# Patient Record
Sex: MALE | Race: WHITE | NOT HISPANIC OR LATINO | Employment: FULL TIME | URBAN - NONMETROPOLITAN AREA
[De-identification: names, ages, dates, MRNs, and addresses within clinical notes are randomized per-mention and may not be internally consistent; named-entity substitution may affect disease eponyms.]

---

## 2017-06-21 ENCOUNTER — HOSPITAL ENCOUNTER (OUTPATIENT)
Facility: HOSPITAL | Age: 47
Setting detail: OBSERVATION
LOS: 1 days | Discharge: HOME | End: 2017-06-22
Attending: EMERGENCY MEDICINE | Admitting: FAMILY MEDICINE

## 2017-06-21 ENCOUNTER — APPOINTMENT (OUTPATIENT)
Dept: GENERAL RADIOLOGY | Facility: HOSPITAL | Age: 47
End: 2017-06-21

## 2017-06-21 ENCOUNTER — APPOINTMENT (OUTPATIENT)
Dept: CT IMAGING | Facility: HOSPITAL | Age: 47
End: 2017-06-21

## 2017-06-21 DIAGNOSIS — I48.91 ATRIAL FIBRILLATION WITH RVR (HCC): Primary | ICD-10-CM

## 2017-06-21 LAB
ALBUMIN SERPL-MCNC: 4.4 G/DL (ref 3.5–5)
ALBUMIN/GLOB SERPL: 1.5 G/DL (ref 1.1–2.5)
ALP SERPL-CCNC: 55 U/L (ref 24–120)
ALT SERPL W P-5'-P-CCNC: 42 U/L (ref 0–54)
AMYLASE SERPL-CCNC: 63 U/L (ref 30–110)
ANION GAP SERPL CALCULATED.3IONS-SCNC: 13 MMOL/L (ref 4–13)
APTT PPP: 31.6 SECONDS (ref 24.1–34.8)
ARTICHOKE IGE QN: 131 MG/DL (ref 0–99)
AST SERPL-CCNC: 24 U/L (ref 7–45)
BASOPHILS # BLD AUTO: 0.03 10*3/MM3 (ref 0–0.2)
BASOPHILS NFR BLD AUTO: 0.4 % (ref 0–2)
BILIRUB SERPL-MCNC: 0.7 MG/DL (ref 0.1–1)
BILIRUB UR QL STRIP: NEGATIVE
BUN BLD-MCNC: 11 MG/DL (ref 5–21)
BUN/CREAT SERPL: 11.7 (ref 7–25)
CALCIUM SPEC-SCNC: 9.3 MG/DL (ref 8.4–10.4)
CHLORIDE SERPL-SCNC: 104 MMOL/L (ref 98–110)
CHOLEST SERPL-MCNC: 192 MG/DL (ref 130–200)
CLARITY UR: CLEAR
CO2 SERPL-SCNC: 23 MMOL/L (ref 24–31)
COLOR UR: YELLOW
CREAT BLD-MCNC: 0.94 MG/DL (ref 0.5–1.4)
D DIMER PPP FEU-MCNC: <0.22 MG/L (FEU) (ref 0–0.5)
DEPRECATED RDW RBC AUTO: 43.9 FL (ref 40–54)
EOSINOPHIL # BLD AUTO: 0.12 10*3/MM3 (ref 0–0.7)
EOSINOPHIL NFR BLD AUTO: 1.7 % (ref 0–4)
ERYTHROCYTE [DISTWIDTH] IN BLOOD BY AUTOMATED COUNT: 13.2 % (ref 12–15)
GFR SERPL CREATININE-BSD FRML MDRD: 86 ML/MIN/1.73
GLOBULIN UR ELPH-MCNC: 3 GM/DL
GLUCOSE BLD-MCNC: 120 MG/DL (ref 70–100)
GLUCOSE UR STRIP-MCNC: NEGATIVE MG/DL
HBA1C MFR BLD: 5.5 %
HCT VFR BLD AUTO: 43.5 % (ref 40–52)
HDLC SERPL-MCNC: 33 MG/DL
HGB BLD-MCNC: 14.6 G/DL (ref 14–18)
HGB UR QL STRIP.AUTO: NEGATIVE
IMM GRANULOCYTES # BLD: 0.02 10*3/MM3 (ref 0–0.03)
IMM GRANULOCYTES NFR BLD: 0.3 % (ref 0–5)
INR PPP: 0.87 (ref 0.91–1.09)
KETONES UR QL STRIP: NEGATIVE
LDLC/HDLC SERPL: 3.62 {RATIO}
LEUKOCYTE ESTERASE UR QL STRIP.AUTO: NEGATIVE
LIPASE SERPL-CCNC: 82 U/L (ref 23–203)
LYMPHOCYTES # BLD AUTO: 2 10*3/MM3 (ref 0.72–4.86)
LYMPHOCYTES NFR BLD AUTO: 28.7 % (ref 15–45)
MCH RBC QN AUTO: 30.7 PG (ref 28–32)
MCHC RBC AUTO-ENTMCNC: 33.6 G/DL (ref 33–36)
MCV RBC AUTO: 91.6 FL (ref 82–95)
MONOCYTES # BLD AUTO: 0.53 10*3/MM3 (ref 0.19–1.3)
MONOCYTES NFR BLD AUTO: 7.6 % (ref 4–12)
NEUTROPHILS # BLD AUTO: 4.28 10*3/MM3 (ref 1.87–8.4)
NEUTROPHILS NFR BLD AUTO: 61.3 % (ref 39–78)
NITRITE UR QL STRIP: NEGATIVE
NT-PROBNP SERPL-MCNC: 117 PG/ML (ref 0–450)
PH UR STRIP.AUTO: 8 [PH] (ref 5–8)
PLATELET # BLD AUTO: 212 10*3/MM3 (ref 130–400)
PMV BLD AUTO: 12.3 FL (ref 6–12)
POTASSIUM BLD-SCNC: 4 MMOL/L (ref 3.5–5.3)
PROT SERPL-MCNC: 7.4 G/DL (ref 6.3–8.7)
PROT UR QL STRIP: NEGATIVE
PROTHROMBIN TIME: 12.1 SECONDS (ref 11.9–14.6)
RBC # BLD AUTO: 4.75 10*6/MM3 (ref 4.8–5.9)
SODIUM BLD-SCNC: 140 MMOL/L (ref 135–145)
SP GR UR STRIP: <=1.005 (ref 1–1.03)
TRIGL SERPL-MCNC: 197 MG/DL (ref 0–149)
TROPONIN I SERPL-MCNC: 0 NG/ML (ref 0–0.07)
TSH SERPL DL<=0.05 MIU/L-ACNC: 1.66 MIU/ML (ref 0.47–4.68)
UROBILINOGEN UR QL STRIP: NORMAL
WBC NRBC COR # BLD: 6.98 10*3/MM3 (ref 4.8–10.8)

## 2017-06-21 PROCEDURE — 80061 LIPID PANEL: CPT | Performed by: FAMILY MEDICINE

## 2017-06-21 PROCEDURE — 82150 ASSAY OF AMYLASE: CPT | Performed by: EMERGENCY MEDICINE

## 2017-06-21 PROCEDURE — 0 IOPAMIDOL PER 1 ML: Performed by: EMERGENCY MEDICINE

## 2017-06-21 PROCEDURE — G0378 HOSPITAL OBSERVATION PER HR: HCPCS

## 2017-06-21 PROCEDURE — 85025 COMPLETE CBC W/AUTO DIFF WBC: CPT | Performed by: EMERGENCY MEDICINE

## 2017-06-21 PROCEDURE — 96366 THER/PROPH/DIAG IV INF ADDON: CPT

## 2017-06-21 PROCEDURE — 84443 ASSAY THYROID STIM HORMONE: CPT | Performed by: EMERGENCY MEDICINE

## 2017-06-21 PROCEDURE — 71010 HC CHEST PA OR AP: CPT

## 2017-06-21 PROCEDURE — 84484 ASSAY OF TROPONIN QUANT: CPT

## 2017-06-21 PROCEDURE — 85730 THROMBOPLASTIN TIME PARTIAL: CPT | Performed by: EMERGENCY MEDICINE

## 2017-06-21 PROCEDURE — 85610 PROTHROMBIN TIME: CPT | Performed by: EMERGENCY MEDICINE

## 2017-06-21 PROCEDURE — 81003 URINALYSIS AUTO W/O SCOPE: CPT | Performed by: EMERGENCY MEDICINE

## 2017-06-21 PROCEDURE — 96372 THER/PROPH/DIAG INJ SC/IM: CPT

## 2017-06-21 PROCEDURE — 99285 EMERGENCY DEPT VISIT HI MDM: CPT

## 2017-06-21 PROCEDURE — 71275 CT ANGIOGRAPHY CHEST: CPT

## 2017-06-21 PROCEDURE — 25010000002 ENOXAPARIN PER 10 MG: Performed by: EMERGENCY MEDICINE

## 2017-06-21 PROCEDURE — 96365 THER/PROPH/DIAG IV INF INIT: CPT

## 2017-06-21 PROCEDURE — 83690 ASSAY OF LIPASE: CPT | Performed by: EMERGENCY MEDICINE

## 2017-06-21 PROCEDURE — 80053 COMPREHEN METABOLIC PANEL: CPT | Performed by: EMERGENCY MEDICINE

## 2017-06-21 PROCEDURE — 93005 ELECTROCARDIOGRAM TRACING: CPT | Performed by: EMERGENCY MEDICINE

## 2017-06-21 PROCEDURE — 93010 ELECTROCARDIOGRAM REPORT: CPT | Performed by: INTERNAL MEDICINE

## 2017-06-21 PROCEDURE — 83036 HEMOGLOBIN GLYCOSYLATED A1C: CPT | Performed by: FAMILY MEDICINE

## 2017-06-21 PROCEDURE — 83880 ASSAY OF NATRIURETIC PEPTIDE: CPT | Performed by: EMERGENCY MEDICINE

## 2017-06-21 PROCEDURE — 85379 FIBRIN DEGRADATION QUANT: CPT | Performed by: EMERGENCY MEDICINE

## 2017-06-21 RX ORDER — ONDANSETRON 2 MG/ML
4 INJECTION INTRAMUSCULAR; INTRAVENOUS EVERY 6 HOURS PRN
Status: DISCONTINUED | OUTPATIENT
Start: 2017-06-21 | End: 2017-06-22 | Stop reason: HOSPADM

## 2017-06-21 RX ORDER — SODIUM CHLORIDE 0.9 % (FLUSH) 0.9 %
1-10 SYRINGE (ML) INJECTION AS NEEDED
Status: DISCONTINUED | OUTPATIENT
Start: 2017-06-21 | End: 2017-06-22 | Stop reason: HOSPADM

## 2017-06-21 RX ORDER — ACETAMINOPHEN 325 MG/1
650 TABLET ORAL EVERY 4 HOURS PRN
Status: DISCONTINUED | OUTPATIENT
Start: 2017-06-21 | End: 2017-06-22 | Stop reason: HOSPADM

## 2017-06-21 RX ORDER — ASPIRIN 325 MG
325 TABLET, DELAYED RELEASE (ENTERIC COATED) ORAL DAILY
Status: DISCONTINUED | OUTPATIENT
Start: 2017-06-21 | End: 2017-06-22 | Stop reason: HOSPADM

## 2017-06-21 RX ORDER — ALUMINA, MAGNESIA, AND SIMETHICONE 2400; 2400; 240 MG/30ML; MG/30ML; MG/30ML
15 SUSPENSION ORAL EVERY 6 HOURS PRN
Status: DISCONTINUED | OUTPATIENT
Start: 2017-06-21 | End: 2017-06-22 | Stop reason: HOSPADM

## 2017-06-21 RX ORDER — DILTIAZEM HCL/D5W 125 MG/125
10 PLASTIC BAG, INJECTION (ML) INTRAVENOUS
Status: DISCONTINUED | OUTPATIENT
Start: 2017-06-21 | End: 2017-06-21

## 2017-06-21 RX ORDER — TRAMADOL HYDROCHLORIDE 50 MG/1
50 TABLET ORAL EVERY 6 HOURS PRN
Status: DISCONTINUED | OUTPATIENT
Start: 2017-06-21 | End: 2017-06-22 | Stop reason: HOSPADM

## 2017-06-21 RX ADMIN — Medication 10 MG/HR: at 12:46

## 2017-06-21 RX ADMIN — ENOXAPARIN SODIUM 100 MG: 100 INJECTION SUBCUTANEOUS at 14:49

## 2017-06-21 RX ADMIN — ASPIRIN 325 MG: 325 TABLET, COATED ORAL at 17:49

## 2017-06-21 RX ADMIN — DILTIAZEM HYDROCHLORIDE 30 MG: 30 TABLET, FILM COATED ORAL at 16:14

## 2017-06-21 RX ADMIN — IOPAMIDOL 150 ML: 755 INJECTION, SOLUTION INTRAVENOUS at 13:18

## 2017-06-21 NOTE — H&P
"    [facility]  HISTORY AND PHYSICAL    Date of Admission: 2017  Primary Care Physician: No Known Provider    Subjective     Chief Complaint: Fast heart rate.     History of Present Illness  Onset of symptoms earlier today.  Fast heart rate.  Fluttering.  Some nausea.   This resolved with heart rate converted.  Had a similar episode about three months ago but did not seek help and it abated on its own.  Does not see doctor routinely.  Goes to [facility] once every other year for seasonal illness.         Review of Systems   Constitutional: Negative.  Negative for fever.   HENT: Negative for congestion and mouth sores.    Eyes: Negative.  Negative for visual disturbance.   Respiratory: Negative for cough and shortness of breath.    Cardiovascular: Positive for palpitations. Negative for chest pain.   Gastrointestinal: Positive for nausea. Negative for diarrhea and vomiting.   Genitourinary: Negative.    Musculoskeletal: Negative for arthralgias and myalgias.   Skin: Negative.    Allergic/Immunologic: Negative.    Neurological: Negative for dizziness.   Hematological: Negative.    Psychiatric/Behavioral: Negative.               Past Medical History: No illnesses  Past Surgical History:None    Social History:  reports that he has never smoked. His smokeless tobacco use includes Chew. He reports that he does not use illicit drugs.   No DOPA  Wife will be surrogate  No living will  Full code  No PCP      Family History:Mother  from complications of wreck/MRSA/[place] last year.   Father alive and healthy    Allergies:  Allergies   Allergen Reactions   • Codeine Nausea And Vomiting   Nausea    Medications:  None          Objective     Vital Signs: /61  Pulse 60  Temp 98.8 °F (37.1 °C)  Resp 19  Ht 69\" (175.3 cm)  Wt 215 lb (97.5 kg)  SpO2 95%  BMI 31.75 kg/m2  Physical Exam   Constitutional: He is oriented to person, place, and time. He appears " well-developed and well-nourished.   Eyes: Conjunctivae and EOM are normal. Pupils are equal, round, and reactive to light.   Neck: Neck supple.   Cardiovascular: Normal rate and regular rhythm.  Exam reveals no gallop and no friction rub.    No murmur heard.  Pulmonary/Chest: Effort normal and breath sounds normal.   Abdominal: Soft. Bowel sounds are normal. There is no hepatosplenomegaly. There is no tenderness.   Musculoskeletal: Normal range of motion. He exhibits no edema.   Neurological: He is alert and oriented to person, place, and time. No cranial nerve deficit.   Skin: Skin is warm and dry.   Psychiatric: He has a normal mood and affect. His behavior is normal.   Nursing note and vitals reviewed.          Results Reviewed:  Lab Results (last 24 hours)     Procedure Component Value Units Date/Time    CBC & Differential [[id number]] Collected:  06/21/17 1222    Specimen:  Blood Updated:  06/21/17 1232    Narrative:       The following orders were created for panel order CBC & Differential.  Procedure                               Abnormality         Status                     ---------                               -----------         ------                     CBC Auto Differential[[id number]]        Abnormal            Final result                 Please view results for these tests on the individual orders.    CBC Auto Differential [[id number]]  (Abnormal) Collected:  06/21/17 1222    Specimen:  Blood Updated:  06/21/17 1232     WBC 6.98 10*3/mm3      RBC 4.75 (L) 10*6/mm3      Hemoglobin 14.6 g/dL      Hematocrit 43.5 %      MCV 91.6 fL      MCH 30.7 pg      MCHC 33.6 g/dL      RDW 13.2 %      RDW-SD 43.9 fl      MPV 12.3 (H) fL      Platelets 212 10*3/mm3      Neutrophil % 61.3 %      Lymphocyte % 28.7 %      Monocyte % 7.6 %      Eosinophil % 1.7 %      Basophil % 0.4 %      Immature Grans % 0.3 %      Neutrophils, Absolute 4.28 10*3/mm3      Lymphocytes, Absolute 2.00 10*3/mm3      Monocytes, Absolute  0.53 10*3/mm3      Eosinophils, Absolute 0.12 10*3/mm3      Basophils, Absolute 0.03 10*3/mm3      Immature Grans, Absolute 0.02 10*3/mm3     Protime-INR [[id number]]  (Abnormal) Collected:  06/21/17 1222    Specimen:  Blood Updated:  06/21/17 1245     Protime 12.1 Seconds      INR 0.87 (L)    aPTT [[id number]]  (Normal) Collected:  06/21/17 1222    Specimen:  Blood Updated:  06/21/17 1245     PTT 31.6 seconds     D-dimer, Quantitative [[id number]]  (Normal) Collected:  06/21/17 1222    Specimen:  Blood Updated:  06/21/17 1245     D-Dimer, Quantitative <0.22 mg/L (FEU)     Narrative:       Reference Range is 0-0.50 mg/L FEU. However, results <0.50 mg/L FEU tends to rule out DVT or PE. Results >0.50 mg/L FEU are not useful in predicting absence or presence of DVT or PE.    Comprehensive Metabolic Panel [[id number]]  (Abnormal) Collected:  06/21/17 1222    Specimen:  Blood Updated:  06/21/17 1246     Glucose 120 (H) mg/dL      BUN 11 mg/dL      Creatinine 0.94 mg/dL      Sodium 140 mmol/L      Potassium 4.0 mmol/L      Chloride 104 mmol/L      CO2 23.0 (L) mmol/L      Calcium 9.3 mg/dL      Total Protein 7.4 g/dL      Albumin 4.40 g/dL      ALT (SGPT) 42 U/L      AST (SGOT) 24 U/L      Alkaline Phosphatase 55 U/L      Total Bilirubin 0.7 mg/dL      eGFR Non African Amer 86 mL/min/1.73      Globulin 3.0 gm/dL      A/G Ratio 1.5 g/dL      BUN/Creatinine Ratio 11.7     Anion Gap 13.0 mmol/L     Amylase [[id number]]  (Normal) Collected:  06/21/17 1222    Specimen:  Blood Updated:  06/21/17 1246     Amylase 63 U/L     Lipase [[id number]]  (Normal) Collected:  06/21/17 1222    Specimen:  Blood Updated:  06/21/17 1246     Lipase 82 U/L     POC Troponin, Rapid [[id number]]  (Normal) Collected:  06/21/17 1237    Specimen:  Blood Updated:  06/21/17 1252     Troponin I 0.00 ng/mL       Serial Number: [id number]    : [id number]       BNP [[id number]]  (Normal) Collected:  06/21/17 1222    Specimen:  Blood Updated:  06/21/17 1253      proBNP 117.0 pg/mL     Urinalysis With / Culture If Indicated [[id number]]  (Normal) Collected:  06/21/17 1255    Specimen:  Urine from Urine, Clean Catch Updated:  06/21/17 1258     Color, UA Yellow     Appearance, UA Clear     pH, UA 8.0     Specific Gravity, UA <=1.005     Glucose, UA Negative     Ketones, UA Negative     Bilirubin, UA Negative     Blood, UA Negative     Protein, UA Negative     Leuk Esterase, UA Negative     Nitrite, UA Negative     Urobilinogen, UA 0.2 E.U./dL    Narrative:       Urine microscopic not indicated.    TSH [[id number]]  (Normal) Collected:  06/21/17 1222    Specimen:  Blood Updated:  06/21/17 1316     TSH 1.660 mIU/mL         Imaging Results (last 24 hours)     Procedure Component Value Units Date/Time    XR Chest 1 View [[id number]] Collected:  06/21/17 1316     Updated:  06/21/17 1316    Narrative:       Exam:   XR CHEST 1 VW-       Date:  06/21/2017      History:  Male, age  46 years;afib with RVR     COMPARISON:  None.     Findings :     The heart and mediastinum are normal in size. Lungs are without focal  infiltrate, mass or effusions.  The bones show no acute pathology.         Impression:       Impression:     No acute cardiopulmonary disease.     This report was finalized on  by Dr. Rosana Miner MD.    CT Angiogram Chest With Contrast [[id number]] Collected:  06/21/17 1418     Updated:  06/21/17 1449    Narrative:       EXAMINATION: CT ANGIOGRAM CHEST W CONTRAST-  6/21/2017 2:09 PM CDT     HISTORY:Palpitations.     COMPARISON: None.     TECHNIQUE:     CT evaluation of the chest was performed with intravenous contrast. 2 mm  transaxial images were obtained. 3-D reconstruction angiographic images  were generated using a volume rendering technique. Coronal  reconstruction maximum intensity projection images of the pulmonary  arteries and aorta were also generated.     Radiation dose equals  mGy-cm.  Automated exposure control dose  reduction technique was implemented.         FINDINGS:     The pulmonary arteries are adequately opacified with iodinated contrast  without intraluminal filling defects or CT angiographic evidence of  pulmonary embolus.     There is no mediastinal or hilar lymphadenopathy.     There is no axillary lymph node enlargement.     There are no consolidating parenchymal infiltrates, pleural effusions or  pneumothoraces.     Limited imaging of the upper abdomen is unremarkable.     The bony structures are intact.       Impression:       1. No CT angiographic evidence of pulmonary embolus or acute aortic  pathology.  This report was finalized on 06/21/2017 14:46 by Dr. Vince Simons MD.          I have personally reviewed and interpreted the radiology studies and ECG obtained at time of admission.     Assessment / Plan     Assessment & Plan  Hospital Problem List     Atrial fibrillation with RVR        Assessment    Paroxysmal atrial fibrillation now in sinus rhythm    Plan    Admit obs.  Change to oral cardizem  Add aspirin  Check A!C and lipid panel  Likely DC in AM      Code Status: full     I discussed the patients findings and my recommendations with patient    Estimated length of stay less than 24 hours  Bobbi Patten,    06/21/17   2:57 PM

## 2017-06-21 NOTE — PLAN OF CARE
Problem: Patient Care Overview (Adult)  Goal: Plan of Care Review  Outcome: Ongoing (interventions implemented as appropriate)    06/21/17 1544   Coping/Psychosocial Response Interventions   Plan Of Care Reviewed With patient;spouse   Patient Care Overview   Progress improving   Outcome Evaluation   Outcome Summary/Follow up Plan pt HR controlled with card gtt at 60         Problem: Arrhythmia/Dysrhythmia (Symptomatic) (Adult)  Goal: Signs and Symptoms of Listed Potential Problems Will be Absent or Manageable (Arrhythmia/Dysrhythmia)  Outcome: Ongoing (interventions implemented as appropriate)

## 2017-06-21 NOTE — ED PROVIDER NOTES
Subjective shortness of breath and palpitations  History of Present Illness   Mr. Barriga is a 46-year-old white man who comes in today with chief complaint of palpitations shortness of breath and dizziness.  The patient tells me that about 10:30 AM he was sitting in his recliner drink coffee when all of a sudden he felt short of breath and dizzy.  That time he states he felt as if his heart was jumping out of his chest.  He does admit that he had a prior episode similar to this one approximately 4 months ago.  That episode however resolved and he did not follow-up.  When the symptoms started today when he became dizzy and short of breath he became concerned and subsequently decided he would come to the hospital for evaluation.  Upon arrival in emergency room his heart rate was in the 160s and it was irregular.    Review of Systems   Constitutional: Negative.    HENT: Negative.    Eyes: Negative.    Respiratory: Negative.    Cardiovascular: Positive for palpitations.   Gastrointestinal: Negative.    Endocrine: Negative.    Genitourinary: Negative.    Musculoskeletal: Negative.    Skin: Negative.    Allergic/Immunologic: Negative.    Neurological: Positive for dizziness.   Hematological: Negative.    Psychiatric/Behavioral: Negative.    All other systems reviewed and are negative.      History reviewed. No pertinent past medical history.    Allergies   Allergen Reactions   • Codeine Nausea And Vomiting       History reviewed. No pertinent surgical history.    History reviewed. No pertinent family history.    Social History     Social History   • Marital status:      Spouse name: N/A   • Number of children: N/A   • Years of education: N/A     Social History Main Topics   • Smoking status: Never Smoker   • Smokeless tobacco: Current User     Types: Chew      Comment: 2x weekly   • Alcohol use None      Comment: 1x monthly    • Drug use: No   • Sexual activity: Not Asked     Other Topics Concern   • None      Social History Narrative   • None           Objective   Physical Exam   Constitutional: He is oriented to person, place, and time. He appears well-developed and well-nourished.   HENT:   Head: Normocephalic and atraumatic.   Right Ear: External ear normal.   Left Ear: External ear normal.   Nose: Nose normal.   Mouth/Throat: Oropharynx is clear and moist.   Eyes: Conjunctivae and EOM are normal. Pupils are equal, round, and reactive to light. Right eye exhibits no discharge. Left eye exhibits no discharge.   Neck: Normal range of motion. Neck supple. No thyromegaly present.   Cardiovascular: Normal heart sounds and intact distal pulses.  Exam reveals no friction rub.    No murmur heard.  Irregularly irregular   Pulmonary/Chest: Effort normal and breath sounds normal. No respiratory distress.   Abdominal: Soft. Bowel sounds are normal. He exhibits no distension. There is no tenderness.   Musculoskeletal: Normal range of motion. He exhibits no edema or deformity.   Neurological: He is alert and oriented to person, place, and time. He has normal reflexes. No cranial nerve deficit.   Skin: Skin is warm and dry. No rash noted.   Psychiatric: Judgment normal.   Nursing note and vitals reviewed.      Procedures         ED Course  ED Course   Comment By Time   the chest appears to converted on the Cardizem.  My plan is to speak to the hospitalist and get him admitted to their services.  He is hemodynamically stable. Janneth Martínez MD 06/21 1419                  MDM  Number of Diagnoses or Management Options  Atrial fibrillation with RVR: new and requires workup     Amount and/or Complexity of Data Reviewed  Clinical lab tests: ordered and reviewed  Tests in the radiology section of CPT®: ordered and reviewed  Discuss the patient with other providers: yes    Risk of Complications, Morbidity, and/or Mortality  Presenting problems: high  Diagnostic procedures: high  Management options: high    Patient  Progress  Patient progress: stable      Final diagnoses:   Atrial fibrillation with RVR            Janneth Martínez MD  06/21/17 6307

## 2017-06-22 VITALS
OXYGEN SATURATION: 98 % | HEIGHT: 69 IN | BODY MASS INDEX: 31.87 KG/M2 | RESPIRATION RATE: 20 BRPM | TEMPERATURE: 98.8 F | SYSTOLIC BLOOD PRESSURE: 114 MMHG | DIASTOLIC BLOOD PRESSURE: 65 MMHG | HEART RATE: 67 BPM | WEIGHT: 215.2 LBS

## 2017-06-22 PROCEDURE — G0378 HOSPITAL OBSERVATION PER HR: HCPCS

## 2017-06-22 RX ORDER — LOPERAMIDE HYDROCHLORIDE 2 MG/1
2 CAPSULE ORAL 4 TIMES DAILY PRN
Status: DISCONTINUED | OUTPATIENT
Start: 2017-06-22 | End: 2017-06-22 | Stop reason: HOSPADM

## 2017-06-22 RX ORDER — ASPIRIN 81 MG/1
81 TABLET ORAL DAILY
Start: 2017-06-22

## 2017-06-22 RX ADMIN — ASPIRIN 325 MG: 325 TABLET, COATED ORAL at 08:28

## 2017-06-22 RX ADMIN — LOPERAMIDE HYDROCHLORIDE 2 MG: 2 CAPSULE ORAL at 09:05

## 2017-06-22 RX ADMIN — DILTIAZEM HYDROCHLORIDE 30 MG: 30 TABLET, FILM COATED ORAL at 12:09

## 2017-06-22 NOTE — PROGRESS NOTES
Discharge Planning Assessment  [facility]     Patient Name: Rahel Pineda  MRN: [id number]  Today's Date: 6/22/2017    Admit Date: 6/21/2017          Discharge Needs Assessment       06/22/17 0847    Living Environment    Lives With spouse    Living Arrangements house    Home Accessibility no concerns    Stair Railings at Home none    Type of Financial/Environmental Concern none    Transportation Available family or friend will provide    Living Environment    Provides Primary Care For no one    Quality Of Family Relationships supportive    Able to Return to Prior Living Arrangements yes    Discharge Needs Assessment    Concerns To Be Addressed no discharge needs identified;denies needs/concerns at this time    Readmission Within The Last 30 Days no previous admission in last 30 days    Anticipated Changes Related to Illness none    Equipment Currently Used at Home none    Equipment Needed After Discharge none    Discharge Disposition home or self-care            Discharge Plan       06/22/17 0837    Case Management/Social Work Plan    Plan PT resides at home with spouse and is independent. PT plans to dc home with no known needs. Will follow.     Patient/Family In Agreement With Plan yes        Discharge Placement     No information found                Demographic Summary     None            Functional Status     None            Psychosocial     None            Abuse/Neglect     None            Legal     None            Substance Abuse     None            Patient Forms     None          VERA Bender

## 2017-06-22 NOTE — PLAN OF CARE
Problem: Patient Care Overview (Adult)  Goal: Plan of Care Review  Outcome: Ongoing (interventions implemented as appropriate)    06/22/17 0414   Coping/Psychosocial Response Interventions   Plan Of Care Reviewed With patient   Patient Care Overview   Progress improving   Outcome Evaluation   Outcome Summary/Follow up Plan VSS. NSR/SB 51-73, as low as 49 on tele. PO Cardizem Q6H. Denies pain this shift. Labs pending this AM. Poss DC this AM. Will cont to monitor.         Problem: Arrhythmia/Dysrhythmia (Symptomatic) (Adult)  Goal: Signs and Symptoms of Listed Potential Problems Will be Absent or Manageable (Arrhythmia/Dysrhythmia)  Outcome: Ongoing (interventions implemented as appropriate)

## 2017-06-22 NOTE — DISCHARGE SUMMARY
[facility] Medicine Services  DISCHARGE SUMMARY       Date of Admission: 6/21/2017  Date of Discharge:  6/22/2017  Primary Care Physician: No Known Provider    Presenting Problem/History of Present Illness:  Atrial fibrillation with RVR [I48.91]  Atrial fibrillation with RVR [I48.91]     Final Discharge Diagnoses:  Hospital Problem List     Atrial fibrillation with RVR          Consults: none    Procedures Performed: none    Pertinent Test Results:    Specimen: Blood Updated: 06/21/17 1246         Glucose 120 (H) mg/dL         BUN 11 mg/dL         Creatinine 0.94 mg/dL         Sodium 140 mmol/L         Potassium 4.0 mmol/L         Chloride 104 mmol/L         CO2 23.0 (L) mmol/L         Calcium 9.3 mg/dL         Total Protein 7.4 g/dL         Albumin 4.40 g/dL         ALT (SGPT) 42 U/L         AST (SGOT) 24 U/L         Alkaline Phosphatase 55 U/L         Total Bilirubin 0.7 mg/dL         eGFR Non African Amer 86 mL/min/1.73         Globulin 3.0 gm/dL         A/G Ratio 1.5 g/dL         BUN/Creatinine Ratio 11.7        Anion Gap 13.0 mmol/L        Protime-INR [[id number]] (Abnormal) Collected: 06/21/17 1222       Lab Status: Final result Specimen: Blood Updated: 06/21/17 1245        Protime 12.1 Seconds         INR 0.87 (L)       aPTT [[id number]] (Normal) Collected: 06/21/17 1222       Lab Status: Final result Specimen: Blood Updated: 06/21/17 1245        PTT 31.6 seconds        D-dimer, Quantitative [[id number]] (Normal) Collected: 06/21/17 1222       Lab Status: Final result Specimen: Blood Updated: 06/21/17 1245        D-Dimer, Quantitative <0.22 mg/L (FEU)        Narrative:         Reference Range is 0-0.50 mg/L FEU. However, results <0.50 mg/L FEU tends to rule out DVT or PE. Results >0.50 mg/L FEU are not useful in predicting absence or presence of DVT or PE.       BNP [[id number]] (Normal) Collected: 06/21/17 1222       Lab Status: Final result Specimen: Blood Updated: 06/21/17  1253        proBNP 117.0 pg/mL        Amylase [[id number]] (Normal) Collected: 06/21/17 1222       Lab Status: Final result Specimen: Blood Updated: 06/21/17 1246        Amylase 63 U/L        Lipase [[id number]] (Normal) Collected: 06/21/17 1222       Lab Status: Final result Specimen: Blood Updated: 06/21/17 1246        Lipase 82 U/L        TSH [[id number]] (Normal) Collected: 06/21/17 1222       Lab Status: Final result Specimen: Blood Updated: 06/21/17 1316        TSH 1.660 mIU/mL        CBC Auto Differential [[id number]] (Abnormal) Collected: 06/21/17 1222       Lab Status: Final result Specimen: Blood Updated: 06/21/17 1232        WBC 6.98 10*3/mm3         RBC 4.75 (L) 10*6/mm3         Hemoglobin 14.6 g/dL         Hematocrit 43.5 %         MCV 91.6 fL         MCH 30.7 pg         MCHC 33.6 g/dL         RDW 13.2 %         RDW-SD 43.9 fl         MPV 12.3 (H) fL         Platelets 212 10*3/mm3         Neutrophil % 61.3 %         Lymphocyte % 28.7 %         Monocyte % 7.6 %         Eosinophil % 1.7 %         Basophil % 0.4 %         Immature Grans % 0.3 %         Neutrophils, Absolute 4.28 10*3/mm3         Lymphocytes, Absolute 2.00 10*3/mm3         Monocytes, Absolute 0.53 10*3/mm3         Eosinophils, Absolute 0.12 10*3/mm3         Basophils, Absolute 0.03 10*3/mm3         Immature Grans, Absolute 0.02 10*3/mm3        Hemoglobin A1c [[id number]] Collected: 06/21/17 1222       Lab Status: Final result Specimen: Blood Updated: 06/21/17 1707        Hemoglobin A1C 5.5 %        Narrative:         Less than 6.0           Non-Diabetic Range  6.0-7.0                 ADA Therapeutic Target  Greater than 7.0        Action Suggested       Lipid Panel [[id number]] (Abnormal) Collected: 06/21/17 1222       Lab Status: Final result Specimen: Blood Updated: 06/21/17 1607        Total Cholesterol 192 mg/dL         Triglycerides 197 (H) mg/dL         HDL Cholesterol 33 (L) mg/dL         LDL Cholesterol  131 (H) mg/dL         LDL/HDL  "Ratio 3.62             Chief Complaint on Day of Discharge: Medicine makes heart rate too slow.         Hospital Course:  The patient is a 46 y.o. male who presented to [facility] with atrial fibrillation with rapid ventricular response.  Started on cardizem drip.  Converted to a NSR in the ER.  Admitted to observation.  Oral cardizem given x 1.  Held thereafter for low heart rate.  On morning of discharge has no complaints.       Condition on Discharge:  Stable     Physical Exam on Discharge:  /67  Pulse 55  Temp 97.3 °F (36.3 °C)  Resp 20  Ht 69\" (175.3 cm)  Wt 215 lb 3.2 oz (97.6 kg)  SpO2 99%  BMI 31.78 kg/m2  Physical Exam   Constitutional: He is oriented to person, place, and time. He appears well-developed and well-nourished.   Eyes: Conjunctivae and EOM are normal. Pupils are equal, round, and reactive to light.   Neck: Normal range of motion. Neck supple. No JVD present.   Cardiovascular: Normal rate, regular rhythm, normal heart sounds and intact distal pulses.    Pulmonary/Chest: Effort normal and breath sounds normal.   Abdominal: Soft. Bowel sounds are normal.   Musculoskeletal: Normal range of motion.   Neurological: He is alert and oriented to person, place, and time.   Skin: Skin is warm and dry.   Psychiatric: He has a normal mood and affect. His behavior is normal.   Nursing note and vitals reviewed.        Discharge Disposition:  Home or Self Care    Discharge Medications:   Rahel Pineda   Home Medication Instructions KRISTINA:[id number]    Printed on:06/22/17 7980   Medication Information                      aspirin 81 MG EC tablet  Take 1 tablet by mouth Daily.                 Discharge Diet:   Diet Instructions     Diet: Regular, Cardiac; Thin Liquids, No Restrictions       Discharge Diet:   Regular  Cardiac      Fluid Consistency:  Thin Liquids, No Restrictions                 Activity at Discharge:   Activity Instructions     Activity as Tolerated               "       Discharge Care Plan/Instructions: If symptoms occur again seek treatment.    Follow-up Appointments:   1 week PCP    Test Results Pending at Discharge: none    Bobbi Patten DO  06/22/17  11:23 AM    Time: 25 minutes

## 2017-06-29 ENCOUNTER — APPOINTMENT (OUTPATIENT)
Dept: CARDIOLOGY | Facility: HOSPITAL | Age: 47
End: 2017-06-29
Attending: FAMILY MEDICINE

## 2017-06-29 ENCOUNTER — TRANSCRIBE ORDERS (OUTPATIENT)
Dept: ADMINISTRATIVE | Facility: HOSPITAL | Age: 47
End: 2017-06-29

## 2017-06-29 DIAGNOSIS — I48.91 ATRIAL FIBRILLATION, UNSPECIFIED TYPE (HCC): Primary | ICD-10-CM

## 2017-06-30 ENCOUNTER — HOSPITAL ENCOUNTER (OUTPATIENT)
Dept: CARDIOLOGY | Facility: HOSPITAL | Age: 47
Discharge: HOME | End: 2017-06-30
Attending: FAMILY MEDICINE | Admitting: FAMILY MEDICINE

## 2017-06-30 VITALS
DIASTOLIC BLOOD PRESSURE: 72 MMHG | SYSTOLIC BLOOD PRESSURE: 130 MMHG | BODY MASS INDEX: 31.84 KG/M2 | WEIGHT: 215 LBS | HEIGHT: 69 IN

## 2017-06-30 DIAGNOSIS — I48.91 ATRIAL FIBRILLATION, UNSPECIFIED TYPE (HCC): ICD-10-CM

## 2017-06-30 PROCEDURE — 93306 TTE W/DOPPLER COMPLETE: CPT

## 2017-06-30 PROCEDURE — 93306 TTE W/DOPPLER COMPLETE: CPT | Performed by: INTERNAL MEDICINE

## 2017-07-03 LAB
BH CV ECHO MEAS - AO MAX PG (FULL): 2.2 MMHG
BH CV ECHO MEAS - AO MAX PG: 8.4 MMHG
BH CV ECHO MEAS - AO MEAN PG (FULL): 1 MMHG
BH CV ECHO MEAS - AO MEAN PG: 4 MMHG
BH CV ECHO MEAS - AO ROOT AREA: 11.9 CM^2
BH CV ECHO MEAS - AO ROOT DIAM: 3.9 CM
BH CV ECHO MEAS - AO V2 MAX: 145 CM/SEC
BH CV ECHO MEAS - AO V2 MEAN: 98.1 CM/SEC
BH CV ECHO MEAS - AO V2 VTI: 31.1 CM
BH CV ECHO MEAS - AVA(I,A): 3.3 CM^2
BH CV ECHO MEAS - AVA(I,D): 3.3 CM^2
BH CV ECHO MEAS - AVA(V,A): 3 CM^2
BH CV ECHO MEAS - AVA(V,D): 3 CM^2
BH CV ECHO MEAS - CONTRAST EF 4CH: 59.6 ML/M^2
BH CV ECHO MEAS - EDV(CUBED): 140.6 ML
BH CV ECHO MEAS - EDV(MOD-SP4): 119 ML
BH CV ECHO MEAS - EDV(TEICH): 129.5 ML
BH CV ECHO MEAS - EF(CUBED): 74.4 %
BH CV ECHO MEAS - EF(MOD-SP4): 59.6 %
BH CV ECHO MEAS - EF(TEICH): 65.9 %
BH CV ECHO MEAS - ESV(CUBED): 35.9 ML
BH CV ECHO MEAS - ESV(MOD-SP4): 48.1 ML
BH CV ECHO MEAS - ESV(TEICH): 44.1 ML
BH CV ECHO MEAS - FS: 36.5 %
BH CV ECHO MEAS - IVS/LVPW: 0.8
BH CV ECHO MEAS - IVSD: 0.95 CM
BH CV ECHO MEAS - LA DIMENSION: 3.9 CM
BH CV ECHO MEAS - LA/AO: 1
BH CV ECHO MEAS - LAT PEAK E' VEL: 15.7 CM/SEC
BH CV ECHO MEAS - LV MASS(C)D: 212.6 GRAMS
BH CV ECHO MEAS - LV MAX PG: 6.3 MMHG
BH CV ECHO MEAS - LV MEAN PG: 3 MMHG
BH CV ECHO MEAS - LV V1 MAX: 125 CM/SEC
BH CV ECHO MEAS - LV V1 MEAN: 84.6 CM/SEC
BH CV ECHO MEAS - LV V1 VTI: 30 CM
BH CV ECHO MEAS - LVIDD: 5.2 CM
BH CV ECHO MEAS - LVIDS: 3.3 CM
BH CV ECHO MEAS - LVLD AP4: 8 CM
BH CV ECHO MEAS - LVLS AP4: 7.2 CM
BH CV ECHO MEAS - LVOT AREA (M): 3.5 CM^2
BH CV ECHO MEAS - LVOT AREA: 3.5 CM^2
BH CV ECHO MEAS - LVOT DIAM: 2.1 CM
BH CV ECHO MEAS - LVPWD: 1.2 CM
BH CV ECHO MEAS - MV A MAX VEL: 52.4 CM/SEC
BH CV ECHO MEAS - MV DEC TIME: 0.18 SEC
BH CV ECHO MEAS - MV E MAX VEL: 94.6 CM/SEC
BH CV ECHO MEAS - MV E/A: 1.8
BH CV ECHO MEAS - PI END-D VEL: 95 CM/SEC
BH CV ECHO MEAS - SV(AO): 371.5 ML
BH CV ECHO MEAS - SV(CUBED): 104.7 ML
BH CV ECHO MEAS - SV(LVOT): 103.9 ML
BH CV ECHO MEAS - SV(MOD-SP4): 70.9 ML
BH CV ECHO MEAS - SV(TEICH): 85.4 ML
E/E' RATIO: 10
LEFT ATRIUM VOLUME: 65 CM3

## 2017-09-09 ENCOUNTER — OFFICE VISIT (OUTPATIENT)
Dept: URGENT CARE | Age: 47
End: 2017-09-09
Payer: COMMERCIAL

## 2017-09-09 VITALS
BODY MASS INDEX: 35.24 KG/M2 | RESPIRATION RATE: 18 BRPM | WEIGHT: 225 LBS | HEART RATE: 83 BPM | TEMPERATURE: 97.6 F | OXYGEN SATURATION: 95 % | DIASTOLIC BLOOD PRESSURE: 80 MMHG | SYSTOLIC BLOOD PRESSURE: 143 MMHG

## 2017-09-09 DIAGNOSIS — J02.9 ACUTE PHARYNGITIS, UNSPECIFIED ETIOLOGY: ICD-10-CM

## 2017-09-09 DIAGNOSIS — J01.90 ACUTE SINUSITIS, RECURRENCE NOT SPECIFIED, UNSPECIFIED LOCATION: Primary | ICD-10-CM

## 2017-09-09 PROCEDURE — 99213 OFFICE O/P EST LOW 20 MIN: CPT | Performed by: SPECIALIST

## 2017-09-09 RX ORDER — FLUTICASONE PROPIONATE 50 MCG
1 SPRAY, SUSPENSION (ML) NASAL DAILY
Qty: 1 BOTTLE | Refills: 3 | Status: SHIPPED | OUTPATIENT
Start: 2017-09-09

## 2017-09-09 RX ORDER — METHYLPREDNISOLONE 4 MG/1
TABLET ORAL
Qty: 1 KIT | Refills: 0 | Status: SHIPPED | OUTPATIENT
Start: 2017-09-09 | End: 2017-09-15

## 2017-09-09 RX ORDER — AMOXICILLIN 875 MG/1
875 TABLET, COATED ORAL 2 TIMES DAILY
Qty: 20 TABLET | Refills: 0 | Status: SHIPPED | OUTPATIENT
Start: 2017-09-09 | End: 2017-09-19

## 2017-09-09 ASSESSMENT — ENCOUNTER SYMPTOMS
SHORTNESS OF BREATH: 0
SINUS PRESSURE: 1
SORE THROAT: 0

## 2018-02-16 ENCOUNTER — OFFICE VISIT (OUTPATIENT)
Dept: URGENT CARE | Age: 48
End: 2018-02-16
Payer: COMMERCIAL

## 2018-02-16 VITALS
OXYGEN SATURATION: 98 % | HEART RATE: 70 BPM | SYSTOLIC BLOOD PRESSURE: 122 MMHG | HEIGHT: 69 IN | TEMPERATURE: 98.6 F | BODY MASS INDEX: 33.47 KG/M2 | WEIGHT: 226 LBS | DIASTOLIC BLOOD PRESSURE: 74 MMHG | RESPIRATION RATE: 18 BRPM

## 2018-02-16 DIAGNOSIS — J10.1 INFLUENZA B: Primary | ICD-10-CM

## 2018-02-16 DIAGNOSIS — R52 BODY ACHES: ICD-10-CM

## 2018-02-16 LAB
INFLUENZA A ANTIBODY: ABNORMAL
INFLUENZA B ANTIBODY: ABNORMAL

## 2018-02-16 PROCEDURE — 87804 INFLUENZA ASSAY W/OPTIC: CPT | Performed by: NURSE PRACTITIONER

## 2018-02-16 PROCEDURE — 99213 OFFICE O/P EST LOW 20 MIN: CPT | Performed by: NURSE PRACTITIONER

## 2018-02-16 RX ORDER — OSELTAMIVIR PHOSPHATE 75 MG/1
75 CAPSULE ORAL 2 TIMES DAILY
Qty: 10 CAPSULE | Refills: 0 | Status: SHIPPED | OUTPATIENT
Start: 2018-02-16 | End: 2018-02-21

## 2018-02-16 RX ORDER — METHYLPREDNISOLONE 4 MG/1
TABLET ORAL
Qty: 1 KIT | Refills: 0 | Status: SHIPPED | OUTPATIENT
Start: 2018-02-16 | End: 2018-02-22

## 2018-02-16 ASSESSMENT — ENCOUNTER SYMPTOMS
RHINORRHEA: 1
SORE THROAT: 1
COUGH: 1

## 2018-02-16 NOTE — PROGRESS NOTES
[address] [facility] CARE  [address] [facility] [place] [address]  Dept: [phone]  Loc: [phone]    Sukhdev Singh is a 49 y.o. male who presents today for his medical conditions/complaints as noted below. Sukhdev Singh is c/o of Cough; Pharyngitis; and Headache        HPI:     HPI  Alec Form presents today with complaints of body aches that started last night. Since Sunday he has had some cold like symptoms with runny nose and stuffy feeling. He states that yesterday he felt worse and began to wonder if he had something more than a cold. He has had some chills and reports a fever of 99 last night. He states he took some cold and flu medication yesterday and this morning with some ibuprofen. He denies any NVD. He reports a mildly productive cough this morning. He is also requesting a medrol dose pack for his arthritis. He states that he has arthritis in his hand and it flares up from time to time and that Dr. Flor Zamora told him in the past to use the medrol dose packs. He states that the steroids work every time. History reviewed. No pertinent past medical history. History reviewed. No pertinent surgical history. History reviewed. No pertinent family history. Social History   Substance Use Topics    Smoking status: Never Smoker    Smokeless tobacco: Never Used    Alcohol use No      Current Outpatient Prescriptions   Medication Sig Dispense Refill    oseltamivir (TAMIFLU) 75 MG capsule Take 1 capsule by mouth 2 times daily for 5 days 10 capsule 0    methylPREDNISolone (MEDROL DOSEPACK) 4 MG tablet Take by mouth.  1 kit 0    aspirin 81 MG tablet Take 81 mg by mouth daily      fluticasone (FLONASE) 50 MCG/ACT nasal spray 1 spray by Nasal route daily 1 Bottle 3    azithromycin (ZITHROMAX) 250 MG tablet Take as directed 1 packet 0    montelukast (SINGULAIR) 10 MG tablet Take 1 tablet by mouth nightly 30 tablet 0    fluticasone (FLONASE) 50 MCG/ACT warm and dry. No rash noted. He is not diaphoretic. Psychiatric: He has a normal mood and affect. His behavior is normal.   Nursing note and vitals reviewed. /74   Pulse 70   Temp 98.6 °F (37 °C)   Resp 18   Ht 5' 9\" (1.753 m)   Wt 226 lb (102.5 kg)   SpO2 98%   BMI 33.37 kg/m²     Assessment:      1. Influenza B  oseltamivir (TAMIFLU) 75 MG capsule    methylPREDNISolone (MEDROL DOSEPACK) 4 MG tablet   2. Body aches  POCT Influenza A/B       Plan:    Flu was positive for flu B. Discussed diagnosis and treatment with patient. I am sending Tamiflu for him to take as directed. He is to monitor for fever and treat as needed with Tylenol. Advised to use Ibuprofen 600-800 mg as needed for bodyaches. Advised symptomatic treatment. If patient is not improving or developing any new/worsening symptoms then RTC, prn or go to ER. Patient verbalizes understanding. Orders Placed This Encounter   Procedures    POCT Influenza A/B     Results for orders placed or performed in visit on 02/16/18   POCT Influenza A/B   Result Value Ref Range    Influenza A Ab neg     Influenza B Ab pos (A)          Return if symptoms worsen or fail to improve. Orders Placed This Encounter   Medications    oseltamivir (TAMIFLU) 75 MG capsule     Sig: Take 1 capsule by mouth 2 times daily for 5 days     Dispense:  10 capsule     Refill:  0    methylPREDNISolone (MEDROL DOSEPACK) 4 MG tablet     Sig: Take by mouth. Dispense:  1 kit     Refill:  0       Patient given educational materials - see patient instructions. Discussed use, benefit, and side effects of prescribed medications. All patient questions answered. Pt voiced understanding. Reviewed health maintenance. Instructed to continue current medications, diet and exercise. Patient agreed with treatment plan. Follow up as directed. Patient Instructions     1. Take Tamiflu as directed  2. Monitor fever and treat as needed  3.  Advised Ibuprofen 600-800 mg three worse. If you need help quitting, talk to your doctor about stop-smoking programs and medicines. These can increase your chances of quitting for good. · Breathe moist air from a hot shower or from a sink filled with hot water to help clear a stuffy nose. · Before you use cough and cold medicines, check the label. These medicines may not be safe for young children or for people with certain health problems. · If the skin around your nose and lips becomes sore, put some petroleum jelly on the area. · To ease coughing:  ¨ Drink fluids to soothe a scratchy throat. ¨ Suck on cough drops or plain hard candy. ¨ Take an over-the-counter cough medicine that contains dextromethorphan to help you get some sleep. Read and follow all instructions on the label. ¨ Raise your head at night with an extra pillow. This may help you rest if coughing keeps you awake. · Take any prescribed medicine exactly as directed. Call your doctor if you think you are having a problem with your medicine. To avoid spreading the flu  · Wash your hands regularly, and keep your hands away from your face. · Stay home from school, work, and other public places until you are feeling better and your fever has been gone for at least 24 hours. The fever needs to have gone away on its own without the help of medicine. · Ask people living with you to talk to their doctors about preventing the flu. They may get antiviral medicine to keep from getting the flu from you. · To prevent the flu in the future, get a flu vaccine every fall. Encourage people living with you to get the vaccine. · Cover your mouth when you cough or sneeze. When should you call for help? Call 911 anytime you think you may need emergency care. For example, call if:  ? · You have severe trouble breathing. ?Call your doctor now or seek immediate medical care if:  ? · You have new or worse trouble breathing. ? · You seem to be getting much sicker.    ? · You feel very sleepy

## 2018-02-16 NOTE — PATIENT INSTRUCTIONS
have new or worse trouble breathing. ? · You seem to be getting much sicker. ? · You feel very sleepy or confused. ? · You have a new or higher fever. ? · You get a new rash. ? Watch closely for changes in your health, and be sure to contact your doctor if:  ? · You begin to get better and then get worse. ? · You are not getting better after 1 week. Where can you learn more? Go to https://Metran[employer]beateb[employer]. org and sign in to your [employer] account. Enter F261 in the [employer] box to learn more about \"Influenza (Flu): Care Instructions. \"     If you do not have an account, please click on the \"Sign Up Now\" link. Current as of: May 12, 2017  Content Version: 11.5  © 8627-6653 [employer], Incorporated. Care instructions adapted under license by [facility] ([facility]). If you have questions about a medical condition or this instruction, always ask your healthcare professional. [place] [address] any warranty or liability for your use of this information.

## 2022-02-24 ENCOUNTER — OFFICE VISIT (OUTPATIENT)
Age: 52
End: 2022-02-24
Payer: COMMERCIAL

## 2022-02-24 VITALS
OXYGEN SATURATION: 97 % | HEART RATE: 91 BPM | RESPIRATION RATE: 20 BRPM | TEMPERATURE: 98.5 F | SYSTOLIC BLOOD PRESSURE: 140 MMHG | WEIGHT: 225 LBS | HEIGHT: 69 IN | DIASTOLIC BLOOD PRESSURE: 76 MMHG | BODY MASS INDEX: 33.33 KG/M2

## 2022-02-24 DIAGNOSIS — J30.81 ALLERGIC RHINITIS DUE TO ANIMAL HAIR AND DANDER: Primary | ICD-10-CM

## 2022-02-24 DIAGNOSIS — J98.01 BRONCHOSPASM: ICD-10-CM

## 2022-02-24 PROCEDURE — 3017F COLORECTAL CA SCREEN DOC REV: CPT | Performed by: STUDENT IN AN ORGANIZED HEALTH CARE EDUCATION/TRAINING PROGRAM

## 2022-02-24 PROCEDURE — G8427 DOCREV CUR MEDS BY ELIG CLIN: HCPCS | Performed by: STUDENT IN AN ORGANIZED HEALTH CARE EDUCATION/TRAINING PROGRAM

## 2022-02-24 PROCEDURE — G8417 CALC BMI ABV UP PARAM F/U: HCPCS | Performed by: STUDENT IN AN ORGANIZED HEALTH CARE EDUCATION/TRAINING PROGRAM

## 2022-02-24 PROCEDURE — G8484 FLU IMMUNIZE NO ADMIN: HCPCS | Performed by: STUDENT IN AN ORGANIZED HEALTH CARE EDUCATION/TRAINING PROGRAM

## 2022-02-24 PROCEDURE — 1036F TOBACCO NON-USER: CPT | Performed by: STUDENT IN AN ORGANIZED HEALTH CARE EDUCATION/TRAINING PROGRAM

## 2022-02-24 PROCEDURE — 99204 OFFICE O/P NEW MOD 45 MIN: CPT | Performed by: STUDENT IN AN ORGANIZED HEALTH CARE EDUCATION/TRAINING PROGRAM

## 2022-02-24 RX ORDER — TRIAMCINOLONE ACETONIDE 55 UG/1
2 SPRAY, METERED NASAL DAILY
Qty: 1 EACH | Refills: 3 | Status: SHIPPED | OUTPATIENT
Start: 2022-02-24

## 2022-02-24 RX ORDER — ALBUTEROL SULFATE 90 UG/1
2 AEROSOL, METERED RESPIRATORY (INHALATION) EVERY 6 HOURS PRN
Qty: 18 G | Refills: 3 | Status: SHIPPED | OUTPATIENT
Start: 2022-02-24

## 2022-02-24 NOTE — PROGRESS NOTES
[address] [facility]  [address] [address] [place] [address]  Dept: [phone]  Dept Fax: [phone]  Loc: [phone]    Zunilda Schaeffer is a 45 y.o. male who presents today for his medical conditions/complaintsas noted below. Zunilda Schaeffer is c/o of Ear Problem (right ear, ear fullness), Headache, Nasal Congestion, and Cough (productive and yellow )        HPI:     HPI    Pt c/o recent aggravation of sinus fullness and R ear feels off. He has off/on allergy sx over the years, not on anything daily. He has strong cat allergy and has one at home that lives in [facility]. He has sneezed out slightly more mucus than normal however not having any sinus pain, purulent discharge, fever, chills, myalgias, malaise. He does mention episodes of light wheezing and difficulty getting his breath at times, sometimes and proximity to cat exposure and also with cold weather. .  No history of asthma    History reviewed. No pertinent past medical history. History reviewed. No pertinent surgical history. History reviewed. No pertinent family history.     Social History     Tobacco Use    Smoking status: Never Smoker    Smokeless tobacco: Never Used   Substance Use Topics    Alcohol use: No      Current Outpatient Medications   Medication Sig Dispense Refill    albuterol sulfate HFA (PROVENTIL HFA) 108 (90 Base) MCG/ACT inhaler Inhale 2 puffs into the lungs every 6 hours as needed for Wheezing 18 g 3    triamcinolone (NASACORT) 55 MCG/ACT nasal inhaler 2 sprays by Each Nostril route daily 1 each 3    aspirin 81 MG tablet Take 81 mg by mouth daily (Patient not taking: Reported on 2/24/2022)      fluticasone (FLONASE) 50 MCG/ACT nasal spray 1 spray by Nasal route daily (Patient not taking: Reported on 2/24/2022) 1 Bottle 3    azithromycin (ZITHROMAX) 250 MG tablet Take as directed (Patient not taking: Reported on 2/24/2022) 1 packet 0    montelukast (SINGULAIR) 10 MG soft. There is no mass. Tenderness: There is no abdominal tenderness. Musculoskeletal:         General: Normal range of motion. Cervical back: Normal range of motion. Skin:     General: Skin is warm and dry. Capillary Refill: Capillary refill takes less than 2 seconds. Neurological:      General: No focal deficit present. Mental Status: He is alert and oriented to person, place, and time. Psychiatric:         Mood and Affect: Mood normal.         Behavior: Behavior normal.         Thought Content: Thought content normal.       BP (!) 140/76   Pulse 91   Temp 98.5 °F (36.9 °C)   Resp 20   Ht 5' 9\" (1.753 m)   Wt 225 lb (102.1 kg)   SpO2 97%   BMI 33.23 kg/m²     Assessment:       Diagnosis Orders   1. Allergic rhinitis due to animal hair and dander   patient has pretty strong cat allergy and allergic response to dander. Recommend he start nasal steroid, take consistently for at least 2 weeks  triamcinolone (NASACORT) 55 MCG/ACT nasal inhaler   2. Bronchospasm  -suspect patient is having some bronchospasms in relation to allergic triggers and possibly cold weather. We will do albuterol inhaler trial  albuterol sulfate HFA (PROVENTIL HFA) 108 (90 Base) MCG/ACT inhaler   Provided patient with my contact number, he has no PCP. Recommended and follow-up with a regular healthcare provider    :    No orders of the defined types were placed in this encounter. No follow-ups on file. No orders of the defined types were placed in this encounter. Orders Placed This Encounter   Medications    albuterol sulfate HFA (PROVENTIL HFA) 108 (90 Base) MCG/ACT inhaler     Sig: Inhale 2 puffs into the lungs every 6 hours as needed for Wheezing     Dispense:  18 g     Refill:  3    triamcinolone (NASACORT) 55 MCG/ACT nasal inhaler     Si sprays by Each Nostril route daily     Dispense:  1 each     Refill:  3       Patient given educationalmaterials - see patient instructions.   Discussed use, benefit, and side effectsof prescribed medications. All patient questions answered. Pt voiced understanding. Reviewed health maintenance. Instructed to continue current medications, diet andexercise. Patient agreed with treatment plan. Follow up as directed.      Patient Instructions   Dr. Paola Nava primary care - [phone]        Electronically signed by Andrew Rascon MD on 2/24/2022 at 1:50 PM

## 2022-06-17 DIAGNOSIS — J98.01 BRONCHOSPASM: ICD-10-CM

## 2022-06-17 RX ORDER — ALBUTEROL SULFATE 90 UG/1
AEROSOL, METERED RESPIRATORY (INHALATION)
Qty: 6.7 EACH | Refills: 3 | OUTPATIENT
Start: 2022-06-17